# Patient Record
Sex: MALE | Race: BLACK OR AFRICAN AMERICAN | NOT HISPANIC OR LATINO | Employment: UNEMPLOYED | URBAN - METROPOLITAN AREA
[De-identification: names, ages, dates, MRNs, and addresses within clinical notes are randomized per-mention and may not be internally consistent; named-entity substitution may affect disease eponyms.]

---

## 2023-12-09 ENCOUNTER — HOSPITAL ENCOUNTER (EMERGENCY)
Facility: OTHER | Age: 33
Discharge: HOME | End: 2023-12-09
Attending: EMERGENCY MEDICINE
Payer: MEDICAID

## 2023-12-09 VITALS
WEIGHT: 150 LBS | RESPIRATION RATE: 18 BRPM | HEIGHT: 74 IN | DIASTOLIC BLOOD PRESSURE: 64 MMHG | OXYGEN SATURATION: 99 % | BODY MASS INDEX: 19.25 KG/M2 | HEART RATE: 55 BPM | TEMPERATURE: 98 F | SYSTOLIC BLOOD PRESSURE: 113 MMHG

## 2023-12-09 DIAGNOSIS — H10.9 CONJUNCTIVITIS OF RIGHT EYE, UNSPECIFIED CONJUNCTIVITIS TYPE: ICD-10-CM

## 2023-12-09 DIAGNOSIS — S05.01XA ABRASION OF RIGHT CORNEA, INITIAL ENCOUNTER: Primary | ICD-10-CM

## 2023-12-09 PROCEDURE — 99283 EMERGENCY DEPT VISIT LOW MDM: CPT

## 2023-12-09 PROCEDURE — 25000003 PHARM REV CODE 250: Performed by: NURSE PRACTITIONER

## 2023-12-09 RX ORDER — PROPARACAINE HYDROCHLORIDE 5 MG/ML
1 SOLUTION/ DROPS OPHTHALMIC
Status: COMPLETED | OUTPATIENT
Start: 2023-12-09 | End: 2023-12-09

## 2023-12-09 RX ORDER — ERYTHROMYCIN 5 MG/G
OINTMENT OPHTHALMIC EVERY 4 HOURS
Qty: 3.5 G | Refills: 0 | Status: SHIPPED | OUTPATIENT
Start: 2023-12-09 | End: 2023-12-16

## 2023-12-09 RX ORDER — ERYTHROMYCIN 5 MG/G
OINTMENT OPHTHALMIC
Status: COMPLETED | OUTPATIENT
Start: 2023-12-09 | End: 2023-12-09

## 2023-12-09 RX ORDER — ERYTHROMYCIN 5 MG/G
OINTMENT OPHTHALMIC EVERY 4 HOURS
Qty: 3.5 G | Refills: 0 | Status: SHIPPED | OUTPATIENT
Start: 2023-12-09 | End: 2023-12-09 | Stop reason: SDUPTHER

## 2023-12-09 RX ADMIN — FLUORESCEIN SODIUM 1 EACH: 1 STRIP OPHTHALMIC at 12:12

## 2023-12-09 RX ADMIN — ERYTHROMYCIN: 5 OINTMENT OPHTHALMIC at 12:12

## 2023-12-09 RX ADMIN — PROPARACAINE HYDROCHLORIDE 1 DROP: 5 SOLUTION/ DROPS OPHTHALMIC at 12:12

## 2023-12-09 NOTE — FIRST PROVIDER EVALUATION
Emergency Department TeleTriage Encounter Note      CHIEF COMPLAINT    Chief Complaint   Patient presents with    Eye Pain     R sided eye pain x 4 days. Redness and swelling noted. Mild itching to eye, reports discomfort. Greenish drainage to inner eye.        VITAL SIGNS   Initial Vitals [12/09/23 1018]   BP Pulse Resp Temp SpO2   113/64 (!) 55 18 98.4 °F (36.9 °C) 99 %      MAP       --            ALLERGIES    Review of patient's allergies indicates:  No Known Allergies    PROVIDER TRIAGE NOTE  Patient presents with redness, drainage and irritation to right eye. Reports exposure to pink eye.       ORDERS  Labs Reviewed - No data to display    ED Orders (720h ago, onward)      None              Virtual Visit Note: The provider triage portion of this emergency department evaluation and documentation was performed via [employer], a HIPAA-compliant telemedicine application, in concert with a tele-presenter in the room. A face to face patient evaluation with one of my colleagues will occur once the patient is placed in an emergency department room.      DISCLAIMER: This note was prepared with [employer] voice recognition transcription software. Garbled syntax, mangled pronouns, and other bizarre constructions may be attributed to that software system.

## 2023-12-09 NOTE — ED PROVIDER NOTES
"Source of History:  Patient    Chief complaint:  Eye Pain (R sided eye pain x 4 days. Redness and swelling noted. Mild itching to eye, reports discomfort. Greenish drainage to inner eye. )      HPI:  Gonsalo Camilo is a 33 y.o. male presenting with right eye redness, irritation and discharge that began 3 days ago, reports had a foreign body sensation which prompted him to present to the emergency department.  He denies any vision changes with this friend was recently diagnosed with pink eye.    This is the extent to the patients complaints today here in the emergency department.    PMH:  As per HPI and below:  No past medical history on file.  No past surgical history on file.         Review of patient's allergies indicates:  No Known Allergies    ROS: As per HPI and below:  Constitutional: No fever.  No chills.  Eyes:  Redness, irritation, drainage  ENT: No sore throat. No ear pain.  MSK: No back pain. No joint pain.   Integument: No rashes or lesions.    Physical Exam:    /64 (BP Location: Left arm, Patient Position: Sitting)   Pulse (!) 55   Temp 98.4 °F (36.9 °C) (Oral)   Resp 18   Ht 6' 2" (1.88 m)   Wt 68 kg (150 lb)   SpO2 99%   BMI 19.26 kg/m²   Vitals:    12/09/23 1018   BP: 113/64   Pulse: (!) 55   Resp: 18   Temp: 98.4 °F (36.9 °C)   TempSrc: Oral   SpO2: 99%   Weight: 68 kg (150 lb)   Height: 6' 2" (1.88 m)       Nursing note and vital signs reviewed.  Constitutional: No acute distress.  Nontoxic  ENT: Normal phonation.  Musculoskeletal: Good range of motion all joints.  No deformities.  Neck supple.  No meningismus. Neurovascularly intact.  Skin: No rashes seen.  Good turgor.  No abrasions.  No ecchymoses.  Psych:  Appropriate, conversant.  Physical Exam  Eyes:      General: Lids are normal.         Right eye: No foreign body.         Left eye: No foreign body.      Extraocular Movements:      Right eye: Normal extraocular motion and no nystagmus.      Left eye: Normal extraocular motion " and no nystagmus.      Conjunctiva/sclera:      Right eye: Right conjunctiva is injected. No chemosis.     Left eye: Left conjunctiva is not injected. No chemosis.     Pupils: Pupils are equal, round, and reactive to light.      Right eye: Pupil is round, reactive and not sluggish. Corneal abrasion present. No fluorescein uptake.      Left eye: Pupil is round, reactive and not sluggish.               No orders to display       MDM/ Differential Dx:   Differential Diagnosis includes, but is not limited to:  Corneal abrasion, retained foreign body, periorbital or orbital cellulitis, keratitis, iritis, subconjunctival hemorrhage, conjunctivitis, hordeolum/chalazion, glaucoma, retinal detachment    33 y.o. male with right eye redness irritation and drainage that began a few days ago, states his eyes and crusted shut in the mornings.  States yesterday he developed a foreign body sensation which prompted him to present to the emergency department.  On exam there is conjunctival redness.  There was a small area of fluorescein uptake.  I believe he has bacterial conjunctivitis and from rubbing his eye he had a small corneal abrasion.  Will discharge the patient home with erythromycin ointment.  Discussed needs close follow-up with Ophthalmology if his symptoms do not improve.  He stated understanding         Diagnostic Impression:    1. Abrasion of right cornea, initial encounter    2. Conjunctivitis of right eye, unspecified conjunctivitis type         ED Disposition Condition    Discharge Stable            ED Prescriptions       Medication Sig Dispense Start Date End Date Auth. Provider    erythromycin (ROMYCIN) ophthalmic ointment  (Status: Discontinued) Place into the right eye every 4 (four) hours. for 7 days 3.5 g 12/9/2023 12/9/2023 Elvia Frost FNP    erythromycin (ROMYCIN) ophthalmic ointment Place into the right eye every 4 (four) hours. for 7 days 3.5 g 12/9/2023 12/16/2023 Elvia Frost FNP           Follow-up Information       Follow up With Specialties Details Why Contact Info    [facility] - Emergency Dept Emergency Medicine Go to  If symptoms worsen [address] [facility] [address] [phone]             Elvia Frost, FNP  12/09/23 1940

## 2023-12-09 NOTE — ED TRIAGE NOTES
Pt presents to ED c/o R eye pain and redness x4 days. Endorses crusty eyelid when awakening, eye itching and burning, and increased tearing. Reports recent contact with co-worker who had pink-eye.